# Patient Record
Sex: MALE | Race: WHITE | Employment: UNEMPLOYED | URBAN - NONMETROPOLITAN AREA
[De-identification: names, ages, dates, MRNs, and addresses within clinical notes are randomized per-mention and may not be internally consistent; named-entity substitution may affect disease eponyms.]

---

## 2021-03-09 ENCOUNTER — PRE-PROCEDURE TELEPHONE (OUTPATIENT)
Dept: PREADMISSION TESTING | Age: 56
End: 2021-03-09

## 2021-03-09 NOTE — TELEPHONE ENCOUNTER
Unable to leave a voicemail message regarding a covid swab appt for March 18th. Voicemail box not set up.

## 2021-03-10 ENCOUNTER — PRE-PROCEDURE TELEPHONE (OUTPATIENT)
Dept: PREADMISSION TESTING | Age: 56
End: 2021-03-10

## 2021-03-10 NOTE — TELEPHONE ENCOUNTER
Unable to leave a voicemail message regarding a covid swab for March 18th,  voicemail box not set up

## 2021-03-11 ENCOUNTER — PRE-PROCEDURE TELEPHONE (OUTPATIENT)
Dept: PREADMISSION TESTING | Age: 56
End: 2021-03-11

## 2021-03-15 ENCOUNTER — PRE-PROCEDURE TELEPHONE (OUTPATIENT)
Dept: PREADMISSION TESTING | Age: 56
End: 2021-03-15

## 2021-03-15 DIAGNOSIS — J44.9 CHRONIC OBSTRUCTIVE PULMONARY DISEASE, UNSPECIFIED COPD TYPE (HCC): Primary | ICD-10-CM

## 2021-03-16 ENCOUNTER — PRE-PROCEDURE TELEPHONE (OUTPATIENT)
Dept: PREADMISSION TESTING | Age: 56
End: 2021-03-16

## 2021-03-17 ENCOUNTER — PRE-PROCEDURE TELEPHONE (OUTPATIENT)
Dept: PREADMISSION TESTING | Age: 56
End: 2021-03-17

## 2021-03-17 NOTE — TELEPHONE ENCOUNTER
Voicemail message left regarding a covid swab appt for March 18th. Instructed to call [phone] and confirm an appt time. The other provided phone number listed does not have a mailbox set up to leave messages on.

## 2021-03-17 NOTE — TELEPHONE ENCOUNTER
Surgery scheduling, Richard Awan, made aware of the multiple unsuccessful attempts to confirm a covid swab for March 18th.

## 2021-04-06 ENCOUNTER — HOSPITAL ENCOUNTER (OUTPATIENT)
Dept: LAB | Age: 56
Discharge: HOME | End: 2021-04-06
Payer: COMMERCIAL

## 2021-04-06 ENCOUNTER — OFFICE VISIT (OUTPATIENT)
Dept: PULMONOLOGY | Age: 56
End: 2021-04-06
Payer: COMMERCIAL

## 2021-04-06 ENCOUNTER — TELEPHONE (OUTPATIENT)
Dept: PULMONOLOGY | Age: 56
End: 2021-04-06

## 2021-04-06 VITALS
HEIGHT: 68 IN | HEART RATE: 113 BPM | WEIGHT: 200 LBS | DIASTOLIC BLOOD PRESSURE: 72 MMHG | SYSTOLIC BLOOD PRESSURE: 118 MMHG | OXYGEN SATURATION: 99 % | BODY MASS INDEX: 30.31 KG/M2

## 2021-04-06 DIAGNOSIS — J45.40 MODERATE PERSISTENT ASTHMA WITHOUT COMPLICATION: ICD-10-CM

## 2021-04-06 DIAGNOSIS — J45.40 MODERATE PERSISTENT ASTHMA WITHOUT COMPLICATION: Primary | ICD-10-CM

## 2021-04-06 PROCEDURE — 1036F TOBACCO NON-USER: CPT | Performed by: INTERNAL MEDICINE

## 2021-04-06 PROCEDURE — 99204 OFFICE O/P NEW MOD 45 MIN: CPT | Performed by: INTERNAL MEDICINE

## 2021-04-06 PROCEDURE — 86003 ALLG SPEC IGE CRUDE XTRC EA: CPT

## 2021-04-06 PROCEDURE — 82785 ASSAY OF IGE: CPT

## 2021-04-06 PROCEDURE — G8417 CALC BMI ABV UP PARAM F/U: HCPCS | Performed by: INTERNAL MEDICINE

## 2021-04-06 PROCEDURE — 36415 COLL VENOUS BLD VENIPUNCTURE: CPT

## 2021-04-06 PROCEDURE — 99214 OFFICE O/P EST MOD 30 MIN: CPT | Performed by: INTERNAL MEDICINE

## 2021-04-06 PROCEDURE — G8427 DOCREV CUR MEDS BY ELIG CLIN: HCPCS | Performed by: INTERNAL MEDICINE

## 2021-04-06 PROCEDURE — 3017F COLORECTAL CA SCREEN DOC REV: CPT | Performed by: INTERNAL MEDICINE

## 2021-04-06 RX ORDER — HYDROXYZINE PAMOATE 50 MG/1
CAPSULE ORAL
COMMUNITY

## 2021-04-06 RX ORDER — PANTOPRAZOLE SODIUM 40 MG/10ML
40 INJECTION, POWDER, LYOPHILIZED, FOR SOLUTION INTRAVENOUS DAILY
COMMUNITY
Start: 2020-06-30 | End: 2021-08-24

## 2021-04-06 RX ORDER — MONTELUKAST SODIUM 10 MG/1
TABLET ORAL
COMMUNITY
End: 2022-05-24 | Stop reason: SDUPTHER

## 2021-04-06 RX ORDER — IPRATROPIUM BROMIDE AND ALBUTEROL SULFATE 2.5; .5 MG/3ML; MG/3ML
SOLUTION RESPIRATORY (INHALATION)
COMMUNITY
End: 2022-05-24 | Stop reason: SDUPTHER

## 2021-04-06 RX ORDER — PREDNISONE 10 MG/1
10 TABLET ORAL 2 TIMES DAILY
Qty: 10 TABLET | Refills: 0 | Status: SHIPPED | OUTPATIENT
Start: 2021-04-06 | End: 2021-04-11

## 2021-04-06 RX ORDER — ALBUTEROL SULFATE 2.5 MG/3ML
SOLUTION RESPIRATORY (INHALATION)
COMMUNITY
End: 2021-04-06 | Stop reason: ALTCHOICE

## 2021-04-06 RX ORDER — MIRTAZAPINE 15 MG/1
TABLET, FILM COATED ORAL
COMMUNITY

## 2021-04-06 RX ORDER — EZETIMIBE 10 MG/1
TABLET ORAL
COMMUNITY

## 2021-04-06 RX ORDER — HYOSCYAMINE SULFATE EXTENDED-RELEASE 0.38 MG/1
TABLET ORAL
COMMUNITY

## 2021-04-06 RX ORDER — TIOTROPIUM BROMIDE 18 UG/1
CAPSULE ORAL; RESPIRATORY (INHALATION)
COMMUNITY
Start: 2021-01-27 | End: 2022-05-24 | Stop reason: SDUPTHER

## 2021-04-06 RX ORDER — LISINOPRIL 40 MG/1
TABLET ORAL
COMMUNITY

## 2021-04-06 RX ORDER — ALBUTEROL SULFATE 90 UG/1
AEROSOL, METERED RESPIRATORY (INHALATION)
COMMUNITY
End: 2022-05-24 | Stop reason: SDUPTHER

## 2021-04-06 RX ORDER — FINASTERIDE 5 MG/1
TABLET, FILM COATED ORAL
COMMUNITY

## 2021-04-06 RX ORDER — VENLAFAXINE HYDROCHLORIDE 150 MG/1
CAPSULE, EXTENDED RELEASE ORAL
COMMUNITY

## 2021-04-06 RX ORDER — DIPHENOXYLATE HYDROCHLORIDE AND ATROPINE SULFATE 2.5; .025 MG/1; MG/1
TABLET ORAL
COMMUNITY

## 2021-04-06 RX ORDER — NEOMYCIN/POLYMYXIN B/DEXAMETHA 3.5-10K-.1
OINTMENT (GRAM) OPHTHALMIC (EYE)
COMMUNITY
Start: 2020-12-16

## 2021-04-06 RX ORDER — MECLIZINE HYDROCHLORIDE 25 MG/1
TABLET ORAL
COMMUNITY
Start: 2020-07-14

## 2021-04-06 RX ORDER — ATORVASTATIN CALCIUM 20 MG/1
TABLET, FILM COATED ORAL
COMMUNITY

## 2021-04-06 RX ORDER — CETIRIZINE HYDROCHLORIDE 10 MG/1
CAPSULE, LIQUID FILLED ORAL
COMMUNITY
Start: 2021-01-27

## 2021-04-06 RX ORDER — ONDANSETRON 4 MG/1
TABLET, ORALLY DISINTEGRATING ORAL
COMMUNITY

## 2021-04-06 RX ORDER — BUSPIRONE HYDROCHLORIDE 10 MG/1
TABLET ORAL
COMMUNITY

## 2021-04-06 RX ORDER — LEVOCETIRIZINE DIHYDROCHLORIDE 5 MG/1
TABLET, FILM COATED ORAL
COMMUNITY
Start: 2021-03-15

## 2021-04-06 SDOH — HEALTH STABILITY: MENTAL HEALTH: HOW OFTEN DO YOU HAVE A DRINK CONTAINING ALCOHOL?: NEVER

## 2021-04-06 SDOH — HEALTH STABILITY: MENTAL HEALTH: HOW MANY STANDARD DRINKS CONTAINING ALCOHOL DO YOU HAVE ON A TYPICAL DAY?: NOT ASKED

## 2021-04-08 LAB
2000687N OAK TREE IGE: <0.1 KU/L (ref 0–0.34)
ALLERGEN BERMUDA GRASS IGE: 0.19 KU/L (ref 0–0.34)
ALLERGEN BIRCH IGE: <0.1 KU/L (ref 0–0.34)
ALLERGEN DOG DANDER IGE: <0.1 KU/L (ref 0–0.34)
ALLERGEN GERMAN COCKROACH IGE: <0.1 KU/L (ref 0–0.34)
ALLERGEN HORMODENDRUM IGE: 0.2 KUL/L (ref 0–0.34)
ALLERGEN MOUSE EPITHELIA IGE: 0.36 KU/L (ref 0–0.34)
ALLERGEN PECAN TREE IGE: <0.1 KU/L (ref 0–0.34)
ALLERGEN PIGWEED ROUGH IGE: <0.1 KU/L (ref 0–0.34)
ALLERGEN SHEEP SORREL (W18) IGE: <0.1 KU/L (ref 0–0.34)
ALLERGEN TREE SYCAMORE: <0.1 KU/L (ref 0–0.34)
ALLERGEN WALNUT TREE IGE: 0.25 KU/L (ref 0–0.34)
ALLERGEN WHITE MULBERRY TREE, IGE: <0.1 KU/L (ref 0–0.34)
ALLERGEN, TREE, WHITE ASH IGE: <0.1 KU/L (ref 0–0.34)
ALTERNARIA ALTERNATA: 0.17 KU/L (ref 0–0.34)
ASPERGILLUS FUMIGATUS: 0.38 KU/L (ref 0–0.34)
CAT DANDER ANTIBODY: 0.11 KU/L (ref 0–0.34)
COTTONWOOD TREE: <0.1 KU/L (ref 0–0.34)
D. FARINAE: 1.12 KU/L (ref 0–0.34)
D. PTERONYSSINUS: 1.59 KU/L (ref 0–0.34)
ELM TREE: <0.1 KU/L (ref 0–0.34)
IGE: 1589 IU/ML
MAPLE/BOXELDER TREE: <0.1 KU/L (ref 0–0.34)
MOUNTAIN CEDAR TREE: 0.28 KU/L (ref 0–0.34)
MUCOR RACEMOSUS: <0.1 KU/L (ref 0–0.34)
P. NOTATUM: <0.1 KU/L (ref 0–0.34)
RUSSIAN THISTLE: <0.1 KU/L (ref 0–0.34)
SHORT RAGWD(A ARTEMIS.) IGE: 21 KU/L (ref 0–0.34)
TIMOTHY GRASS: <0.1 KU/L (ref 0–0.34)

## 2021-05-12 ENCOUNTER — PRE-PROCEDURE TELEPHONE (OUTPATIENT)
Dept: PREADMISSION TESTING | Age: 56
End: 2021-05-12

## 2021-05-12 NOTE — TELEPHONE ENCOUNTER
Spoke with patient and confirmed a covid swab appt for May 20th at 0830. Instructions provided, verbalizes understanding.

## 2021-05-20 ENCOUNTER — HOSPITAL ENCOUNTER (OUTPATIENT)
Dept: PREADMISSION TESTING | Age: 56
Setting detail: SPECIMEN
Discharge: HOME | End: 2021-05-24
Payer: COMMERCIAL

## 2021-05-20 DIAGNOSIS — Z11.59 ENCOUNTER FOR SCREENING FOR OTHER VIRAL DISEASES: Primary | ICD-10-CM

## 2021-05-20 PROCEDURE — U0005 INFEC AGEN DETEC AMPLI PROBE: HCPCS

## 2021-05-20 PROCEDURE — U0003 INFECTIOUS AGENT DETECTION BY NUCLEIC ACID (DNA OR RNA); SEVERE ACUTE RESPIRATORY SYNDROME CORONAVIRUS 2 (SARS-COV-2) (CORONAVIRUS DISEASE [COVID-19]), AMPLIFIED PROBE TECHNIQUE, MAKING USE OF HIGH THROUGHPUT TECHNOLOGIES AS DESCRIBED BY CMS-2020-01-R: HCPCS

## 2021-05-21 LAB
SARS-COV-2: NORMAL
SARS-COV-2: NOT DETECTED
SOURCE: NORMAL

## 2021-05-25 ENCOUNTER — HOSPITAL ENCOUNTER (OUTPATIENT)
Dept: LAB | Age: 56
Discharge: HOME | End: 2021-05-25
Payer: COMMERCIAL

## 2021-05-25 ENCOUNTER — OFFICE VISIT (OUTPATIENT)
Dept: PULMONOLOGY | Age: 56
End: 2021-05-25
Payer: COMMERCIAL

## 2021-05-25 ENCOUNTER — HOSPITAL ENCOUNTER (OUTPATIENT)
Dept: PULMONOLOGY | Age: 56
Discharge: HOME | End: 2021-05-25
Payer: COMMERCIAL

## 2021-05-25 VITALS
OXYGEN SATURATION: 98 % | HEART RATE: 96 BPM | HEIGHT: 68 IN | SYSTOLIC BLOOD PRESSURE: 110 MMHG | BODY MASS INDEX: 30.41 KG/M2 | DIASTOLIC BLOOD PRESSURE: 72 MMHG

## 2021-05-25 DIAGNOSIS — J44.9 CHRONIC OBSTRUCTIVE PULMONARY DISEASE, UNSPECIFIED COPD TYPE (HCC): ICD-10-CM

## 2021-05-25 DIAGNOSIS — R76.8 ELEVATED IGE LEVEL: ICD-10-CM

## 2021-05-25 DIAGNOSIS — J45.50 SEVERE PERSISTENT ALLERGIC ASTHMA: Primary | ICD-10-CM

## 2021-05-25 DIAGNOSIS — J45.50 SEVERE PERSISTENT ALLERGIC ASTHMA: ICD-10-CM

## 2021-05-25 PROBLEM — J44.1 ACUTE EXACERBATION OF CHRONIC OBSTRUCTIVE AIRWAYS DISEASE (HCC): Status: ACTIVE | Noted: 2021-05-25

## 2021-05-25 PROBLEM — J45.909 ASTHMA WITHOUT STATUS ASTHMATICUS: Status: ACTIVE | Noted: 2021-05-25

## 2021-05-25 PROBLEM — J30.9 ALLERGIC RHINITIS: Status: ACTIVE | Noted: 2021-05-25

## 2021-05-25 PROBLEM — K21.9 GASTROESOPHAGEAL REFLUX DISEASE: Status: ACTIVE | Noted: 2021-05-25

## 2021-05-25 LAB
EOSINOPHILS ABSOLUTE: 312 /UL (ref 200–400)
EOSINOPHILS RELATIVE PERCENT: NORMAL %
WBC # BLD: NORMAL K/UL

## 2021-05-25 PROCEDURE — G8417 CALC BMI ABV UP PARAM F/U: HCPCS | Performed by: INTERNAL MEDICINE

## 2021-05-25 PROCEDURE — G8427 DOCREV CUR MEDS BY ELIG CLIN: HCPCS | Performed by: INTERNAL MEDICINE

## 2021-05-25 PROCEDURE — 99214 OFFICE O/P EST MOD 30 MIN: CPT | Performed by: INTERNAL MEDICINE

## 2021-05-25 PROCEDURE — 94640 AIRWAY INHALATION TREATMENT: CPT

## 2021-05-25 PROCEDURE — 85048 AUTOMATED LEUKOCYTE COUNT: CPT

## 2021-05-25 PROCEDURE — 94060 EVALUATION OF WHEEZING: CPT

## 2021-05-25 PROCEDURE — 3017F COLORECTAL CA SCREEN DOC REV: CPT | Performed by: INTERNAL MEDICINE

## 2021-05-25 PROCEDURE — 94729 DIFFUSING CAPACITY: CPT

## 2021-05-25 PROCEDURE — 1036F TOBACCO NON-USER: CPT | Performed by: INTERNAL MEDICINE

## 2021-05-25 PROCEDURE — 6370000000 HC RX 637 (ALT 250 FOR IP): Performed by: INTERNAL MEDICINE

## 2021-05-25 PROCEDURE — 36415 COLL VENOUS BLD VENIPUNCTURE: CPT

## 2021-05-25 PROCEDURE — 94726 PLETHYSMOGRAPHY LUNG VOLUMES: CPT

## 2021-05-25 RX ORDER — AZELASTINE HYDROCHLORIDE 0.5 MG/ML
SOLUTION/ DROPS OPHTHALMIC
COMMUNITY
Start: 2021-04-28

## 2021-05-25 RX ORDER — LORAZEPAM 0.5 MG/1
0.5 TABLET ORAL EVERY 6 HOURS PRN
COMMUNITY

## 2021-05-25 RX ORDER — PREDNISONE 10 MG/1
TABLET ORAL
COMMUNITY
Start: 2021-04-30 | End: 2022-05-24

## 2021-05-25 RX ORDER — FEXOFENADINE HCL 180 MG/1
TABLET, FILM COATED ORAL
COMMUNITY
Start: 2021-03-26

## 2021-05-25 RX ORDER — BENZONATATE 100 MG/1
CAPSULE ORAL
COMMUNITY

## 2021-05-25 RX ORDER — ALBUTEROL SULFATE 90 UG/1
4 AEROSOL, METERED RESPIRATORY (INHALATION) ONCE
Status: COMPLETED | OUTPATIENT
Start: 2021-05-25 | End: 2021-05-25

## 2021-05-25 RX ADMIN — ALBUTEROL SULFATE 4 PUFF: 90 AEROSOL, METERED RESPIRATORY (INHALATION) at 11:13

## 2021-05-25 NOTE — PROGRESS NOTES
Returning for follow-up. Initially seen in early April with asthma. His IgE levels were markedly elevated. He had another exacerbation that required steroids at the end of April. Patient has elevated eosinophil count. He is on Advair 500/50, Spiriva, Singulair and as needed DuoNeb aerosols. We try to get preapproval for Nucala.   RTC 3 months

## 2021-06-05 NOTE — PROCEDURES
PULMONARY FUNCTION TEST      DATE 2021    COMPONENTS  Following components of the pulmonary function tests were performed during this study:    [x] Spirometry with the Forced Vital Capacity maneuver; with pre-and post- bronchodilator challenge  [] Spirometry with the Forced Vital Capacity maneuver; without pre-and post- bronchodilator challenge  [x] Flow-volume loop  [x] Lung volumes (Body plethysmography, when applicable)  [x] Airway resistance  [x] Diffusion capacity  [x] Resting oxygen saturation  [] Maximum inspiratory and expiratory pressures    QUALITY  Based on technician observations and review of the raw data, show that the study is of good quality and the results seem to reflect true performance capacity, as the patient demonstrated good effort and cooperation.      MEASUREMENTS  FVC: 4.05 L (90% predicted), improved to 4.14 L (92% predicted), +2% change after bronchodilator  FEV1: 2.8 L (79% predicted), improved to 2.83 L (80% predicted), +1% change after bronchodilator  FEV1/FVC: 68%  T.32 L (80% predicted)  RV: 1.05 L (51% predicted)  RV/T%  DLCO: 25.36 mL/min/mmHg    SPIROMETRY       []   NORMAL  FEV1/FVC > 70% or LLN   Normal FEV1   Normal FVC     [x]   OBSTRUCTIVE VENTILATORY IMPAIRMENT  FEV1/FVC < 70% or LLN   Normal or reduced FEV1   Normal FVC     []   SUGGESTS RESTRICTION    FEV1/FVC > 70% or LLN   Reduced FEV1   Reduced FVC     []   SUGGESTS SMALL AIRWAY DISEASE  FEV1/FVC > 70% or LLN   Normal FEV1 and FVC   Reduced FEF 25-75%     DEGREE OF OBSTRUCTIVE IMPAIRMENT    (ATS/ERS-2005) FEV1 (% predicted)   [x] MILD  >70 %   [] MODERATE  60-69 %   [] MODERATELY SEVERE  50-59 %   [] SEVERE  35-49 %   [] VERY SEVERE  <35 %      (COPD GOLD) FEV1 (% predicted)   [x] GOLD-I  >80 %   [] GOLD-II  50-79 %   [] GOLD-III  30-49 %   [] GOLD-IV  <30 %     BRONCHODILATOR RESPONSE    [x]   No significant response    <8% improvement in FEV1     []   Borderline response  9 to 11% improvement in FEV1   or    at least 200 mL increase in FEV1     []   Significant response  > 11% improvement in FEV1   and    at least 200 mL increase in FEV1     FLOW-VOLUME LOOP PATTERN    []     NORMAL      [x]   OBSTRUCTIVE   Chronic obstructive pulmonary disease   Emphysema   Asthma     []   RESTRICTIVE   Interstitial lung diseases   Neuromuscular weakness   Thoracic wall abnormalities     []   VARIABLE- EXTRATHORACIC  Hypopharyngeal or tracheal neoplasm   Vocal cord paralysis   Subglottic stenosis       []     VARIABLE- INTRATHORACIC  Neoplasm of lower trachea   Tracheomalacia   Strictures   Wegener's granulomatosis   Relapsing polychondritis     []   FIXED OBSTRUCTIVE   Neoplasm of central airway   Vocal cord paralysis   Fibrotic stricture     []   SAWTOOTH   Obstructive sleep apnea   Pharyngeal muscle weakness     LUNG VOLUMES/BODY PLETHYSMOGRAPHY     TOTAL LUNG CAPACITY  [] NORMAL    [x] AT LOWER LIMIT OF NORMAL    [] REDUCED (CONFIRMS RESTRICTION) < 80 % predicted   [] INCREASED (HYPERINFLATION) >120 % predicted   [] UNABLE TO PERFORM      DEGREE OF RESTRICTIVE IMPAIRMENT    FVC (% predicted)   [] MILD 60-80 %   [] MODERATE 50-60 %   [] SEVERE <50 %     RESIDUAL VOLUME or RV/TLC  [x] NORMAL    [] AIR TRAPPING PRESENT >120 %     EXPIRATORY RESERVE VOLUME  [] NORMAL   [x] REDUCED (CONSISTENT WITH BODY HABITUS/OBESITY)     AIRWAY RESISTANCE  [] NORMAL   [x] MILDLY INCREASED   [] MODERATELY INCREASED   [] SEVERELY INCREASED     DIFFUSION CAPACITY       DLCO (% predicted)   [x] NORMAL    [] NORMAL (WHEN CORRECTED FOR ALVEOLAR VOLUME)    [] MILD REDUCTION > 60 % and <75 %   [] MODERATE REDUCTION 40 to 60 %   [] SEVERE REDUCTION <40 %   [] INCREASED >125 %     FINAL IMPRESSION     The overall study shows mild obstructive ventilatory defect. No significant bronchodilator response. Air trapping and hyperinflation are absent. Airway resistance mildly increased.  The diffusion capacity is

## 2021-06-28 ENCOUNTER — TELEPHONE (OUTPATIENT)
Dept: PULMONOLOGY | Age: 56
End: 2021-06-28

## 2021-06-28 DIAGNOSIS — J45.50 SEVERE PERSISTENT ALLERGIC ASTHMA: Primary | ICD-10-CM

## 2021-06-28 NOTE — TELEPHONE ENCOUNTER
Please send prescription for Nucala auto injector to patient's pharmacy, thanks. If you can please add on the prescription [id number], this is to let pharmacy know that PA has been completed.

## 2021-06-29 RX ORDER — MEPOLIZUMAB 100 MG/ML
100 INJECTION, SOLUTION SUBCUTANEOUS
Qty: 1 ML | Refills: 5 | Status: SHIPPED | OUTPATIENT
Start: 2021-06-29 | End: 2021-09-24 | Stop reason: SDUPTHER

## 2021-07-27 DIAGNOSIS — J45.40 MODERATE PERSISTENT ASTHMA WITHOUT COMPLICATION: ICD-10-CM

## 2021-07-27 NOTE — PROGRESS NOTES
Subjective:      Patient ID: Kristine Rincon is a 64 y.o. male. HPI  Patient here for follow-up for asthma and elevated IgE. Patient was last seen in the clinic in May 2021 per Dr. Trenton Closs. Patient is on Nucala which he self administers at home. Received his first dose in July, took a second dose in August and his next is due in September. He is vaccinated with [employer] against Covid. Denies any significant shortness of breath, cough. Notes that once we start taking corn out of the cornfield he typically has asthmatic difficulties, will be curious to see how he does with harvesting season. Medications:   Nucala last dose 8/6/2021  DuoNeb  Singulair 10 mg  Spiriva 18 mcg  Albuterol HFA  Xyzal  Cetirizine 10 mg  Advair 500-50 mcg    PRIOR WORKUP:  PFT:  PFT 5/25/2021: FVC 4.05 L (90% of predicted improved to 4.14 L 92% of predicted postbronchodilator), FEV1 2.8 L (79% of predicted improved to 2.83 L 80% of predicted postbronchodilator), FEV1/FVC ratio of 68%. TLC 5.32 L (80% of predicted), RV 1.05 L (51% of predicted), RV/TLC 63% of predicted, DLCO 25.36. Final impression: Mild obstructive ventilatory defect with no significant bronchodilator response. Air trapping and hyperinflation are absent. Airway resistance is mildly increased. Diffusion capacity is within normal limit. CT Imaging:  CTA chest 1/23/2021: Negative for pulmonary embolism. Lungs are clear of infiltrate and pleural effusion. No bulky mediastinal or hilar lymphadenopathy.     Sleep Study:    Laboratory Evaluation:  Eosinophil count 5/25/2021: 312normal  Allergen respiratory panel 4/6/2021:  -IgE 1589elevated  -Alternaria alternata 0.17normal  -Maple/Box Elder tree <0.10normal  -Cat dander 0.11normal  -Mountain cedar tree 0.28normal  -[place] tree <0.10normal  -Allergen pigweed rough IgE <0.10normal  Russian thistle <0.10normal  Jd grass <0.10normal  Allergen Hormodendrum IgE 0.20normal  Elm tree <0.10normal  Oak tree IgE <0.10normal  Allergen birch IgE <0.10normal  - Aspergillus fumigatus 0.38elevated  D. pterpnyssinus 1.59elevated  D. Farinae 1.12elevated  Bermuda grass IgE 0.19normal  Allergen white tariq tree IgE <0.10normal  -P. Notatum <0.10normal  Short ragweed IgE 21.00elevated  Cockroach IgE <0.10normal  Allergen [place] tree <0.10normal  [place] tree IgE 0.25normal  Pecan tree IgE <0.10normal  Mouse epithelial 0.36elevated  Mucor Racemosus <0.10normal  White mulberry tree IgE <0.10normal  Dog dander IgE <0.10normal  Sheep Chay IgE <0.10normal      Immunizations:   Immunization History   Administered Date(s) Administered    COVID-19, J&J, PF, 0.5 mL 03/17/2021    Influenza Virus Vaccine 09/27/2014, 11/22/2015, 10/08/2016, 09/16/2019, 09/11/2020    Pneumococcal Conjugate 13-valent ([id number]) 02/20/2019        No flowsheet data found. /80 (Site: Right Upper Arm, Position: Sitting, Cuff Size: Medium Adult)   Pulse 96   Temp 98.3 °F (36.8 °C)   Ht 5' 8\" (1.727 m)   Wt 197 lb (89.4 kg)   SpO2 98%   BMI 29.95 kg/m²     No past medical history on file. No past surgical history on file. No family history on file.     Social History     Socioeconomic History    Marital status: Single     Spouse name: Not on file    Number of children: Not on file    Years of education: Not on file    Highest education level: Not on file   Occupational History    Not on file   Tobacco Use    Smoking status: Never Smoker    Smokeless tobacco: Never Used   Vaping Use    Vaping Use: Never used   Substance and Sexual Activity    Alcohol use: Never    Drug use: Never    Sexual activity: Not on file   Other Topics Concern    Not on file   Social History Narrative    Not on file     Social Determinants of Health     Financial Resource Strain:     Difficulty of Paying Living Expenses:    Food Insecurity:     Worried About Running Out of Food in the Last Year:     noted}  Extremities - peripheral pulses normal, no pedal edema, no clubbing or cyanosis  Skin - normal coloration and turgor, no rashes, no suspicious skin lesions noted     Wt Readings from Last 3 Encounters:   08/24/21 197 lb (89.4 kg)   04/06/21 200 lb (90.7 kg)       Results for orders placed or performed during the hospital encounter of 05/25/21   Eosinophil count   Result Value Ref Range    WBC NOT REPORTED k/uL    Eosinophils % NOT REPORTED %    Eosinophils Absolute 312 200 - 400 /uL       No results found. Assessment:      1. Elevated IgE level    2. Moderate persistent asthma without complication    3. Chronic obstructive pulmonary disease, unspecified COPD type ([facility] [address]          Plan:      1. Medications reviewed, continue as ordered. 2. Educated and clarified the medication use. 3. Recommend flu vaccination in the fall annually. Due in fall  4. Patient is up-to-date with vaccinations from pulmonary perspective. Including Covid  5. Maintain an active lifestyle. 6. Patient's questions were answered to his satisfaction. 7. Pulmonary function tests were reviewed  8. CT scan of the chest was reviewed  9.  We'll see the patient back in 9 months        Electronically signed by GABBIE Ashraf CNP on 8/24/2021 at 2:30 PM

## 2021-08-24 ENCOUNTER — OFFICE VISIT (OUTPATIENT)
Dept: PULMONOLOGY | Age: 56
End: 2021-08-24
Payer: COMMERCIAL

## 2021-08-24 VITALS
BODY MASS INDEX: 29.86 KG/M2 | SYSTOLIC BLOOD PRESSURE: 120 MMHG | TEMPERATURE: 98.3 F | HEART RATE: 96 BPM | HEIGHT: 68 IN | WEIGHT: 197 LBS | OXYGEN SATURATION: 98 % | DIASTOLIC BLOOD PRESSURE: 80 MMHG

## 2021-08-24 DIAGNOSIS — J45.40 MODERATE PERSISTENT ASTHMA WITHOUT COMPLICATION: ICD-10-CM

## 2021-08-24 DIAGNOSIS — R76.8 ELEVATED IGE LEVEL: Primary | ICD-10-CM

## 2021-08-24 DIAGNOSIS — J44.9 CHRONIC OBSTRUCTIVE PULMONARY DISEASE, UNSPECIFIED COPD TYPE (HCC): ICD-10-CM

## 2021-08-24 PROCEDURE — 3023F SPIROM DOC REV: CPT | Performed by: NURSE PRACTITIONER

## 2021-08-24 PROCEDURE — 99213 OFFICE O/P EST LOW 20 MIN: CPT | Performed by: NURSE PRACTITIONER

## 2021-08-24 PROCEDURE — G8427 DOCREV CUR MEDS BY ELIG CLIN: HCPCS | Performed by: NURSE PRACTITIONER

## 2021-08-24 PROCEDURE — G8417 CALC BMI ABV UP PARAM F/U: HCPCS | Performed by: NURSE PRACTITIONER

## 2021-08-24 PROCEDURE — 99214 OFFICE O/P EST MOD 30 MIN: CPT | Performed by: NURSE PRACTITIONER

## 2021-08-24 PROCEDURE — G8926 SPIRO NO PERF OR DOC: HCPCS | Performed by: NURSE PRACTITIONER

## 2021-08-24 PROCEDURE — 1036F TOBACCO NON-USER: CPT | Performed by: NURSE PRACTITIONER

## 2021-08-24 PROCEDURE — 3017F COLORECTAL CA SCREEN DOC REV: CPT | Performed by: NURSE PRACTITIONER

## 2021-08-24 RX ORDER — PANTOPRAZOLE SODIUM 40 MG/1
1 TABLET, DELAYED RELEASE ORAL DAILY
COMMUNITY
Start: 2021-07-27

## 2021-08-24 ASSESSMENT — ENCOUNTER SYMPTOMS
GASTROINTESTINAL NEGATIVE: 1
RESPIRATORY NEGATIVE: 1
EYES NEGATIVE: 1
ALLERGIC/IMMUNOLOGIC NEGATIVE: 1

## 2021-09-24 DIAGNOSIS — J45.50 SEVERE PERSISTENT ALLERGIC ASTHMA: ICD-10-CM

## 2021-09-24 RX ORDER — MEPOLIZUMAB 100 MG/ML
100 INJECTION, SOLUTION SUBCUTANEOUS
Qty: 1 ML | Refills: 5 | Status: SHIPPED | OUTPATIENT
Start: 2021-09-24 | End: 2022-03-03

## 2021-11-29 DIAGNOSIS — J45.40 MODERATE PERSISTENT ASTHMA WITHOUT COMPLICATION: ICD-10-CM

## 2022-03-02 DIAGNOSIS — J45.50 SEVERE PERSISTENT ALLERGIC ASTHMA: ICD-10-CM

## 2022-03-03 RX ORDER — MEPOLIZUMAB 100 MG/ML
INJECTION, SOLUTION SUBCUTANEOUS
Qty: 1 ML | Refills: 5 | Status: SHIPPED | OUTPATIENT
Start: 2022-03-03 | End: 2022-05-24 | Stop reason: SDUPTHER

## 2022-05-11 NOTE — PROGRESS NOTES
Subjective:      Patient ID: Kristine Rincon is a 64 y.o. male. HPI  Patient here for follow-up for asthma and elevated IgE. Patient was last seen in the office in August 2021 for me and in May 2021 per Dr. Trenton Closs. Patient reports that he was treated for bronchitis 4 weeks ago. He has noted increase in wheezing in the morning and associated cough. Was recently [place] and noticed a high prevalence of pine trees in the area. He also is noting some increasing congestion. Suspect that his symptoms are related to seasonal allergy flareup. He continues on his Nucala. Once he takes his maintenance inhalers he does note that his breathing does get better later in the day. Medications:   Nucala last dose 5/24/22  DuoNeb  Singulair 10 mg  Spiriva 18 mcg  Albuterol HFA  Xyzal  Cetirizine 10 mg  Advair 500-50 mcg     PRIOR WORKUP:  PFT:  PFT 5/25/2021: FVC 4.05 L (90% of predicted improved to 4.14 L 92% of predicted postbronchodilator), FEV1 2.8 L (79% of predicted improved to 2.83 L 80% of predicted postbronchodilator), FEV1/FVC ratio of 68%. TLC 5.32 L (80% of predicted), RV 1.05 L (51% of predicted), RV/TLC 63% of predicted, DLCO 25.36. Final impression: Mild obstructive ventilatory defect with no significant bronchodilator response. Air trapping and hyperinflation are absent. Airway resistance is mildly increased. Diffusion capacity is within normal limit.     CT Imaging:  CTA chest 1/23/2021: Negative for pulmonary embolism. Lungs are clear of infiltrate and pleural effusion.   No bulky mediastinal or hilar lymphadenopathy.     Sleep Study:     Laboratory Evaluation:  Eosinophil count 5/25/2021: 312-normal  Allergen respiratory panel 4/6/2021:  -IgE 1589-elevated  -Alternaria alternata 0.17-normal  -Maple/Box Elder tree <0.10-normal  -Cat dander 0.11-normal  -Mountain cedar tree 0.28-normal  -[place] tree <0.10-normal  -Allergen pigweed rough IgE <0.10-normal  -[place] thistle <0.10-normal  -Jd grass <0.10-normal  -Allergen Hormodendrum IgE 0.20-normal  -Elm tree <0.10-normal  -Oak tree IgE <0.10-normal  -Allergen birch IgE <0.10-normal  - Aspergillus fumigatus 0.38-elevated  -D. pterpnyssinus 1.59-elevated  -D. Farinae 1.12-elevated  -Bermuda grass IgE 0.19-normal  -Allergen white tariq tree IgE <0.10-normal  -P. Notatum <0.10-normal  -Short ragweed IgE 21.00-elevated  -Cockroach IgE <0.10-normal  -Allergen [place] tree <0.10-normal  -[place] tree IgE 0.25-normal  -Pecan tree IgE <0.10-normal  -Mouse epithelial 0.36-elevated  -Mucor Racemosus <0.10-normal  -White mulberry tree IgE <0.10-normal  -Dog dander IgE <0.10-normal  -Sheep Chay IgE <0.10-normal    Immunizations:   Immunization History   Administered Date(s) Administered    COVID-19, J&J, PF, 0.5 mL 03/17/2021    Influenza Virus Vaccine 09/27/2014, 11/22/2015, 10/08/2016, 09/16/2019, 09/11/2020    Pneumococcal Conjugate 13-valent ([id number]) 02/20/2019        No flowsheet data found. /68 (Site: Left Upper Arm, Position: Sitting)   Pulse 98   Wt 208 lb 12.8 oz (94.7 kg)   SpO2 97%   BMI 31.75 kg/m²     History reviewed. No pertinent past medical history. History reviewed. No pertinent surgical history. History reviewed. No pertinent family history.     Social History     Socioeconomic History    Marital status: Single     Spouse name: Not on file    Number of children: Not on file    Years of education: Not on file    Highest education level: Not on file   Occupational History    Not on file   Tobacco Use    Smoking status: Never Smoker    Smokeless tobacco: Never Used   Vaping Use    Vaping Use: Never used   Substance and Sexual Activity    Alcohol use: Never    Drug use: Never    Sexual activity: Not on file   Other Topics Concern    Not on file   Social History Narrative    Not on file     Social Determinants of Health     Financial Resource Strain:     Difficulty of Paying Living Expenses: Not on file   Food Insecurity:     Worried About Running Out of Food in the Last Year: Not on file    Debora of Food in the Last Year: Not on file   Transportation Needs:     Lack of Transportation (Medical): Not on file    Lack of Transportation (Non-Medical): Not on file   Physical Activity:     Days of Exercise per Week: Not on file    Minutes of Exercise per Session: Not on file   Stress:     Feeling of Stress : Not on file   Social Connections:     Frequency of Communication with Friends and Family: Not on file    Frequency of Social Gatherings with Friends and Family: Not on file    Attends Mandaeism Services: Not on file    Active Member of [address] [place] [address] [employer] or Organizations: Not on file    Attends Club or Organization Meetings: Not on file    Marital Status: Not on file   Intimate Partner Violence:     Fear of Current or Ex-Partner: Not on file    Emotionally Abused: Not on file    Physically Abused: Not on file    Sexually Abused: Not on file   Housing Stability:     Unable to Pay for Housing in the Last Year: Not on file    Number of [place] in the Last Year: Not on file    Unstable Housing in the Last Year: Not on file       Review of Systems   Constitutional: Negative. HENT:        Increased congestion, cough nonproductive. Symptoms more prevalent in the morning   Eyes: Negative. Respiratory:        Chest tightness/wheezing more prevalent in the morning improves after inhaler use   Cardiovascular: Negative. Gastrointestinal: Negative. Endocrine: Negative. Genitourinary: Negative. Musculoskeletal: Negative. Skin: Negative. Allergic/Immunologic: Negative. Neurological: Negative. Hematological: Negative. Psychiatric/Behavioral: Negative.         Objective:       Physical Exam  General appearance - alert, well appearing, and in no distress, oriented to person, place, and time and overweight  Mental status - alert, oriented to person, place, and time, normal mood, behavior, speech, dress, motor activity, and thought processes  Eyes - pupils equal and reactive, extraocular eye movements intact  Ears - not examined  Nose - normal and patent, no erythema, discharge or polyps  Mouth - mucous membranes moist, pharynx normal without lesions  Neck - supple, no significant adenopathy  Chest - clear to auscultation, no wheezes, rales or rhonchi, symmetric air entry  Heart -normal rate, regular rhythm, normal S1, S2, no murmurs, rubs, clicks or gallops  Abdomen - soft, nontender, nondistended, no masses or organomegaly  Neuro- alert, oriented, normal speech, no focal findings or movement disorder noted}  Extremities - peripheral pulses normal, no pedal edema, no clubbing or cyanosis  Skin - normal coloration and turgor, no rashes, no suspicious skin lesions noted     Wt Readings from Last 3 Encounters:   05/24/22 208 lb 12.8 oz (94.7 kg)   08/24/21 197 lb (89.4 kg)   04/06/21 200 lb (90.7 kg)       Results for orders placed or performed during the hospital encounter of 05/25/21   Eosinophil count   Result Value Ref Range    WBC NOT REPORTED k/uL    Eosinophils % NOT REPORTED %    Eosinophils Absolute 312 200 - 400 /uL       No results found. Assessment:      1. Severe persistent allergic asthma    2. Moderate persistent asthma without complication    3. Elevated IgE level          Plan:      1. Medications reviewed, continue as ordered. 2. Educated and clarified the medication use. 3. Recommend flu vaccination in the fall annually. 4. Patient is up-to-date with pneumococcal vaccinations from pulmonary perspective. 5. Patient has received Covid vaccination. Recommended booster when eligible. Discussed strategies to protect against Covid 19.   6. Maintain an active lifestyle. 7. Patient's questions were answered to his satisfaction. 8. Pulmonary function tests were reviewed  9. CT scan of the chest was reviewed  10.  We'll see the patient back in 12 months Electronically signed by GABBIE Mendenhall CNP on 5/24/2022 at 2:15 PM

## 2022-05-24 ENCOUNTER — OFFICE VISIT (OUTPATIENT)
Dept: PULMONOLOGY | Age: 57
End: 2022-05-24
Payer: COMMERCIAL

## 2022-05-24 VITALS
SYSTOLIC BLOOD PRESSURE: 116 MMHG | BODY MASS INDEX: 31.75 KG/M2 | OXYGEN SATURATION: 97 % | DIASTOLIC BLOOD PRESSURE: 68 MMHG | HEART RATE: 98 BPM | WEIGHT: 208.8 LBS

## 2022-05-24 DIAGNOSIS — R76.8 ELEVATED IGE LEVEL: ICD-10-CM

## 2022-05-24 DIAGNOSIS — J45.40 MODERATE PERSISTENT ASTHMA WITHOUT COMPLICATION: ICD-10-CM

## 2022-05-24 DIAGNOSIS — J45.50 SEVERE PERSISTENT ALLERGIC ASTHMA: Primary | ICD-10-CM

## 2022-05-24 PROCEDURE — 1036F TOBACCO NON-USER: CPT | Performed by: NURSE PRACTITIONER

## 2022-05-24 PROCEDURE — 99213 OFFICE O/P EST LOW 20 MIN: CPT | Performed by: NURSE PRACTITIONER

## 2022-05-24 PROCEDURE — G8417 CALC BMI ABV UP PARAM F/U: HCPCS | Performed by: NURSE PRACTITIONER

## 2022-05-24 PROCEDURE — 99214 OFFICE O/P EST MOD 30 MIN: CPT | Performed by: NURSE PRACTITIONER

## 2022-05-24 PROCEDURE — 3017F COLORECTAL CA SCREEN DOC REV: CPT | Performed by: NURSE PRACTITIONER

## 2022-05-24 PROCEDURE — G8427 DOCREV CUR MEDS BY ELIG CLIN: HCPCS | Performed by: NURSE PRACTITIONER

## 2022-05-24 RX ORDER — MONTELUKAST SODIUM 10 MG/1
TABLET ORAL
Qty: 30 TABLET | Refills: 11 | Status: SHIPPED | OUTPATIENT
Start: 2022-05-24

## 2022-05-24 RX ORDER — ALBUTEROL SULFATE 90 UG/1
AEROSOL, METERED RESPIRATORY (INHALATION)
Qty: 18 G | Refills: 11 | Status: SHIPPED | OUTPATIENT
Start: 2022-05-24

## 2022-05-24 RX ORDER — TIOTROPIUM BROMIDE 18 UG/1
CAPSULE ORAL; RESPIRATORY (INHALATION)
Qty: 30 CAPSULE | Refills: 11 | Status: SHIPPED | OUTPATIENT
Start: 2022-05-24

## 2022-05-24 RX ORDER — MEPOLIZUMAB 100 MG/ML
INJECTION, SOLUTION SUBCUTANEOUS
Qty: 1 ML | Refills: 5 | Status: SHIPPED | OUTPATIENT
Start: 2022-05-24 | End: 2022-09-01

## 2022-05-24 RX ORDER — IPRATROPIUM BROMIDE AND ALBUTEROL SULFATE 2.5; .5 MG/3ML; MG/3ML
SOLUTION RESPIRATORY (INHALATION)
Qty: 360 ML | Refills: 11 | Status: SHIPPED | OUTPATIENT
Start: 2022-05-24

## 2022-05-24 RX ORDER — FLUTICASONE PROPIONATE AND SALMETEROL 500; 50 UG/1; UG/1
1 POWDER RESPIRATORY (INHALATION) EVERY 12 HOURS
Qty: 60 EACH | Refills: 3 | Status: SHIPPED | OUTPATIENT
Start: 2022-05-24 | End: 2022-10-24

## 2022-05-24 ASSESSMENT — ENCOUNTER SYMPTOMS
GASTROINTESTINAL NEGATIVE: 1
EYES NEGATIVE: 1
ALLERGIC/IMMUNOLOGIC NEGATIVE: 1

## 2022-06-01 ENCOUNTER — TELEPHONE (OUTPATIENT)
Dept: PULMONOLOGY | Age: 57
End: 2022-06-01

## 2022-06-01 NOTE — TELEPHONE ENCOUNTER
Ben Guzman was wondering if she could be of assistance with getting the nucala covered.     [phone]  Option 9

## 2022-09-01 DIAGNOSIS — J45.50 SEVERE PERSISTENT ALLERGIC ASTHMA: ICD-10-CM

## 2022-09-01 RX ORDER — MEPOLIZUMAB 100 MG/ML
INJECTION, SOLUTION SUBCUTANEOUS
Qty: 1 ML | Refills: 5 | Status: SHIPPED | OUTPATIENT
Start: 2022-09-01

## 2022-10-24 RX ORDER — FLUTICASONE PROPIONATE AND SALMETEROL 500; 50 UG/1; UG/1
POWDER RESPIRATORY (INHALATION)
Qty: 1 EACH | Refills: 3 | Status: SHIPPED | OUTPATIENT
Start: 2022-10-24

## 2022-10-24 NOTE — TELEPHONE ENCOUNTER
Last Appt:  5/24/2022  Next Appt:  5/23/2023  Med verified in [facility]

## 2023-03-29 DIAGNOSIS — J45.50 SEVERE PERSISTENT ALLERGIC ASTHMA: ICD-10-CM

## 2023-03-29 NOTE — TELEPHONE ENCOUNTER
Last Appt:  5/24/2022  Next Appt:   5/23/2023  Med verified in [facility]

## 2023-04-01 RX ORDER — MEPOLIZUMAB 100 MG/ML
INJECTION, SOLUTION SUBCUTANEOUS
Qty: 1 ML | Refills: 5 | Status: SHIPPED | OUTPATIENT
Start: 2023-04-01

## 2023-04-17 RX ORDER — FLUTICASONE PROPIONATE AND SALMETEROL 500; 50 UG/1; UG/1
1 POWDER RESPIRATORY (INHALATION) 2 TIMES DAILY
Qty: 1 EACH | Refills: 2 | Status: SHIPPED | OUTPATIENT
Start: 2023-04-17 | End: 2023-05-17

## 2023-04-17 NOTE — TELEPHONE ENCOUNTER
Last Appt:  5/24/2022  Next Appt:   5/23/2023  Med verified in [facility]

## 2023-05-12 RX ORDER — ALBUTEROL SULFATE 90 UG/1
AEROSOL, METERED RESPIRATORY (INHALATION)
Qty: 18 G | Refills: 11 | Status: SHIPPED | OUTPATIENT
Start: 2023-05-12

## 2023-06-01 ENCOUNTER — OFFICE VISIT (OUTPATIENT)
Dept: PULMONOLOGY | Age: 58
End: 2023-06-01
Payer: MEDICARE

## 2023-06-01 VITALS
HEART RATE: 66 BPM | BODY MASS INDEX: 29.34 KG/M2 | SYSTOLIC BLOOD PRESSURE: 126 MMHG | HEIGHT: 68 IN | TEMPERATURE: 97.1 F | DIASTOLIC BLOOD PRESSURE: 80 MMHG | OXYGEN SATURATION: 99 % | WEIGHT: 193.6 LBS

## 2023-06-01 DIAGNOSIS — J30.9 ALLERGIC RHINITIS, UNSPECIFIED SEASONALITY, UNSPECIFIED TRIGGER: ICD-10-CM

## 2023-06-01 DIAGNOSIS — R76.8 ELEVATED IGE LEVEL: ICD-10-CM

## 2023-06-01 DIAGNOSIS — J45.50 SEVERE PERSISTENT ALLERGIC ASTHMA: Primary | ICD-10-CM

## 2023-06-01 PROCEDURE — 99214 OFFICE O/P EST MOD 30 MIN: CPT | Performed by: INTERNAL MEDICINE

## 2023-06-01 RX ORDER — PREDNISONE 50 MG/1
TABLET ORAL
COMMUNITY
Start: 2023-05-30

## 2023-06-01 NOTE — PROGRESS NOTES
PRESCRIPTION DRUG MANAGEMENT/RECOMMENDATIONS:  Patient currently on Advair, Spiriva, Singulair, cetirizine and DuoNeb aerosols  In addition, he is back on Nucala and has received 2 shots  He is also seeing allergy specialist  Reviewed use of rescue vs controlling agents and potential side effects  Reviewed use, techniques, schedule and side effects of all inhaled medications  Refills were provided as requested  Barriers to medication compliance addressed. Patient to have prednisone and an antibiotic available for use during an exacerbation    SUPPLEMENTAL OXYGEN/NIV RECOMMENDATIONS:  Patient is not on home oxygen therapy. LIFESTYLE MODIFICATION RECOMMENDATIONS/COUNSELING:  Follow healthy behaviors: nutrition, exercise and medication adherence  Maintain an active lifestyle    LUNG CANCER SCREENING/OTHER IMAGING RECOMMENDATIONS:  After reviewing the patient's smoking history, age and other clinical data, patient DOES NOT meet the following Low Dose CT (LDCT) Lung Screening criteria:    [] Age: Screening recommended if age range is 55-77(Medicare) or 50-80 (USPST)  [x] Pack-yr: Screening recommended if pack year smoking >20  [] Duration since quit: Screening recommended if still smoking or less than 15 year since quit  [] Lung cancer: Screening not recommended if there are sign or symptoms of lung cancer   [] Duration since last CT: Screening recommended if > 11 months since last LDCT        IMMUNIZATION HISTORY/RECOMMENDATIONS:    Immunization History   Administered Date(s) Administered    COVID-19, J&J, (age 18y+), IM, 0.5 mL 03/17/2021    Influenza Virus Vaccine 09/27/2014, 11/22/2015, 10/08/2016, 09/16/2019, 09/11/2020    Pneumococcal, PCV-13, PREVNAR 13, (age 6w+), IM, 0.5mL 02/20/2019       All the questions that the patient had were answered to his satisfaction  We'll see the patient back in three months  Thank you for having us involved in the care of your patient.  Please call us if you have any

## 2023-06-07 RX ORDER — TIOTROPIUM BROMIDE 18 UG/1
CAPSULE ORAL; RESPIRATORY (INHALATION)
Qty: 30 CAPSULE | Refills: 11 | Status: SHIPPED | OUTPATIENT
Start: 2023-06-07

## 2023-08-14 PROBLEM — J45.909 ASTHMA WITHOUT STATUS ASTHMATICUS: Status: RESOLVED | Noted: 2021-05-25 | Resolved: 2023-08-14

## 2023-08-14 PROBLEM — R76.8 ELEVATED IGE LEVEL: Status: ACTIVE | Noted: 2023-08-14

## 2023-08-14 PROBLEM — J45.50 SEVERE PERSISTENT ALLERGIC ASTHMA: Status: ACTIVE | Noted: 2023-08-14

## 2023-08-14 PROBLEM — J44.1 ACUTE EXACERBATION OF CHRONIC OBSTRUCTIVE AIRWAYS DISEASE (HCC): Status: RESOLVED | Noted: 2021-05-25 | Resolved: 2023-08-14

## 2023-09-25 DIAGNOSIS — J45.50 SEVERE PERSISTENT ALLERGIC ASTHMA: ICD-10-CM

## 2023-09-25 RX ORDER — MEPOLIZUMAB 100 MG/ML
INJECTION, SOLUTION SUBCUTANEOUS
Qty: 1 ML | Refills: 5 | Status: SHIPPED | OUTPATIENT
Start: 2023-09-25

## 2023-09-25 NOTE — TELEPHONE ENCOUNTER
Last Appt:  6/1/2023  Next Appt:   Visit date not found  Med verified in [address] [place] [address]

## 2024-01-12 DIAGNOSIS — J45.50 SEVERE PERSISTENT ALLERGIC ASTHMA: ICD-10-CM

## 2024-01-12 RX ORDER — MEPOLIZUMAB 100 MG/ML
INJECTION, SOLUTION SUBCUTANEOUS
Qty: 1 ML | Refills: 5 | Status: SHIPPED | OUTPATIENT
Start: 2024-01-12

## 2024-01-12 NOTE — TELEPHONE ENCOUNTER
Last Appt:  6/1/2023  Next Appt:   Visit date not found  Med verified in Epic     Patient needs prescription sent to new pharmacy due to insurance.

## 2024-03-04 RX ORDER — ALBUTEROL SULFATE 90 UG/1
AEROSOL, METERED RESPIRATORY (INHALATION)
Qty: 8.5 G | Refills: 11 | Status: SHIPPED | OUTPATIENT
Start: 2024-03-04

## 2024-05-15 ENCOUNTER — TELEPHONE (OUTPATIENT)
Dept: PULMONOLOGY | Age: 59
End: 2024-05-15

## 2024-05-15 NOTE — TELEPHONE ENCOUNTER
Patient does not want to order any more Nucala.  Patient would like to talk to you about the document sent from Dr. Flowers (in Media) to discuss switching to Tezspire.  He also has [employer] and will not be using [facility]

## 2024-05-16 NOTE — TELEPHONE ENCOUNTER
Dr. Flowers's office note is in media. Please advise if you think it is okay for the patient to switch over to Tezspire.     Last Appt:  6/1/2023  Next Appt:   7/9/2024  Med verified in Epic

## 2024-05-28 ENCOUNTER — TELEPHONE (OUTPATIENT)
Dept: PULMONOLOGY | Age: 59
End: 2024-05-28

## 2024-05-28 DIAGNOSIS — J45.50 SEVERE PERSISTENT ALLERGIC ASTHMA: Primary | ICD-10-CM

## 2024-05-28 RX ORDER — TEZEPELUMAB-EKKO 210 MG/1.9ML
210 INJECTION, SOLUTION SUBCUTANEOUS
Qty: 1.91 ML | Refills: 12 | Status: SHIPPED | OUTPATIENT
Start: 2024-05-28 | End: 2025-04-30

## 2024-05-28 NOTE — TELEPHONE ENCOUNTER
Tezspire auto injector has been approved by insurance. Please send prescription to [facility] specialty pharmacy. Thanks      Last Appt:  6/1/2023  Next Appt:   7/9/2024  Med verified in Epic

## 2024-07-09 ENCOUNTER — OFFICE VISIT (OUTPATIENT)
Dept: PULMONOLOGY | Age: 59
End: 2024-07-09
Payer: MEDICARE

## 2024-07-09 VITALS
WEIGHT: 196.4 LBS | OXYGEN SATURATION: 95 % | SYSTOLIC BLOOD PRESSURE: 118 MMHG | HEART RATE: 81 BPM | BODY MASS INDEX: 29.77 KG/M2 | HEIGHT: 68 IN | RESPIRATION RATE: 16 BRPM | DIASTOLIC BLOOD PRESSURE: 70 MMHG

## 2024-07-09 DIAGNOSIS — J45.50 SEVERE PERSISTENT ALLERGIC ASTHMA: Primary | ICD-10-CM

## 2024-07-09 PROCEDURE — 99213 OFFICE O/P EST LOW 20 MIN: CPT | Performed by: INTERNAL MEDICINE

## 2024-07-09 PROCEDURE — 99214 OFFICE O/P EST MOD 30 MIN: CPT | Performed by: INTERNAL MEDICINE

## 2024-07-09 RX ORDER — HYDROXYZINE HYDROCHLORIDE 50 MG/1
TABLET, FILM COATED ORAL
COMMUNITY
Start: 2024-05-28

## 2024-07-09 RX ORDER — MONTELUKAST SODIUM 4 MG/1
1 TABLET, CHEWABLE ORAL 2 TIMES DAILY
COMMUNITY
Start: 2024-05-09

## 2024-07-09 RX ORDER — MIRTAZAPINE 30 MG/1
TABLET, FILM COATED ORAL
COMMUNITY
Start: 2024-05-28

## 2024-07-09 RX ORDER — UMECLIDINIUM 62.5 UG/1
1 AEROSOL, POWDER ORAL DAILY
Qty: 1 EACH | Refills: 2 | Status: SHIPPED | OUTPATIENT
Start: 2024-07-09 | End: 2024-09-16

## 2024-07-09 RX ORDER — POTASSIUM CHLORIDE 750 MG/1
10 TABLET, EXTENDED RELEASE ORAL DAILY
COMMUNITY

## 2024-07-09 RX ORDER — DICYCLOMINE HCL 20 MG
TABLET ORAL
COMMUNITY
Start: 2022-11-21

## 2024-07-09 RX ORDER — FLUTICASONE PROPIONATE AND SALMETEROL 500; 50 UG/1; UG/1
1 POWDER RESPIRATORY (INHALATION) 2 TIMES DAILY
Qty: 1 EACH | Refills: 2 | Status: SHIPPED | OUTPATIENT
Start: 2024-07-09 | End: 2024-08-08

## 2024-07-09 RX ORDER — ALPRAZOLAM 0.5 MG
0.5 TABLET ORAL 3 TIMES DAILY
COMMUNITY

## 2024-07-09 RX ORDER — TAMSULOSIN HYDROCHLORIDE 0.4 MG/1
CAPSULE ORAL
COMMUNITY
Start: 2018-07-31

## 2024-07-09 RX ORDER — FUROSEMIDE 20 MG
20 TABLET ORAL DAILY
COMMUNITY

## 2024-07-09 RX ORDER — FAMOTIDINE 20 MG/1
20 TABLET, FILM COATED ORAL DAILY
COMMUNITY

## 2024-09-16 DIAGNOSIS — J45.50 SEVERE PERSISTENT ALLERGIC ASTHMA: ICD-10-CM

## 2024-09-16 RX ORDER — UMECLIDINIUM 62.5 UG/1
1 AEROSOL, POWDER ORAL DAILY
Qty: 1 EACH | Refills: 2 | Status: SHIPPED | OUTPATIENT
Start: 2024-09-16 | End: 2024-12-15

## 2024-11-08 ENCOUNTER — TELEPHONE (OUTPATIENT)
Dept: PULMONOLOGY | Age: 59
End: 2024-11-08

## 2024-11-08 DIAGNOSIS — J45.50 SEVERE PERSISTENT ALLERGIC ASTHMA: Primary | ICD-10-CM

## 2024-11-08 NOTE — TELEPHONE ENCOUNTER
Patient called, has run out of his Duoneb for his nebulizer. Was wondering if he can get a refill?     Writer pended a script, please sign if you agree.

## 2024-11-09 RX ORDER — IPRATROPIUM BROMIDE AND ALBUTEROL SULFATE 2.5; .5 MG/3ML; MG/3ML
1 SOLUTION RESPIRATORY (INHALATION) EVERY 4 HOURS
Qty: 540 ML | Refills: 2 | Status: SHIPPED | OUTPATIENT
Start: 2024-11-09 | End: 2025-02-07

## 2025-01-07 DIAGNOSIS — J45.50 SEVERE PERSISTENT ALLERGIC ASTHMA: ICD-10-CM

## 2025-01-08 DIAGNOSIS — J45.50 SEVERE PERSISTENT ALLERGIC ASTHMA (HCC): ICD-10-CM

## 2025-01-08 RX ORDER — FLUTICASONE PROPIONATE AND SALMETEROL 500; 50 UG/1; UG/1
1 POWDER RESPIRATORY (INHALATION) 2 TIMES DAILY
Qty: 1 EACH | Refills: 2 | Status: SHIPPED | OUTPATIENT
Start: 2025-01-08 | End: 2025-04-08

## 2025-01-08 RX ORDER — FLUTICASONE PROPIONATE AND SALMETEROL 500; 50 UG/1; UG/1
1 POWDER RESPIRATORY (INHALATION) 2 TIMES DAILY
Refills: 2 | OUTPATIENT
Start: 2025-01-08 | End: 2025-04-08

## 2025-04-28 DIAGNOSIS — J45.50 SEVERE PERSISTENT ALLERGIC ASTHMA (HCC): ICD-10-CM

## 2025-04-29 RX ORDER — TEZEPELUMAB-EKKO 210 MG/1.9ML
210 INJECTION, SOLUTION SUBCUTANEOUS
Qty: 1 EACH | Refills: 11 | Status: ACTIVE | OUTPATIENT
Start: 2025-04-29 | End: 2026-03-04

## 2025-05-01 DIAGNOSIS — J45.50 SEVERE PERSISTENT ALLERGIC ASTHMA (HCC): ICD-10-CM

## 2025-05-01 RX ORDER — IPRATROPIUM BROMIDE AND ALBUTEROL SULFATE 2.5; .5 MG/3ML; MG/3ML
1 SOLUTION RESPIRATORY (INHALATION) EVERY 4 HOURS
Qty: 540 ML | Refills: 2 | Status: SHIPPED | OUTPATIENT
Start: 2025-05-01 | End: 2025-07-30

## 2025-08-24 DIAGNOSIS — J45.50 SEVERE PERSISTENT ALLERGIC ASTHMA (HCC): ICD-10-CM

## 2025-08-25 RX ORDER — UMECLIDINIUM 62.5 UG/1
1 AEROSOL, POWDER ORAL DAILY
Qty: 1 EACH | Refills: 2 | Status: SHIPPED | OUTPATIENT
Start: 2025-08-25 | End: 2025-11-23